# Patient Record
Sex: FEMALE | Race: WHITE | Employment: UNEMPLOYED | URBAN - METROPOLITAN AREA
[De-identification: names, ages, dates, MRNs, and addresses within clinical notes are randomized per-mention and may not be internally consistent; named-entity substitution may affect disease eponyms.]

---

## 2021-05-20 ENCOUNTER — HOSPITAL ENCOUNTER (OUTPATIENT)
Age: 66
Setting detail: SPECIMEN
Discharge: HOME | End: 2021-05-20
Payer: MEDICARE

## 2021-05-25 LAB — CYTOLOGY REPORT: NORMAL

## 2021-07-03 ENCOUNTER — HOSPITAL ENCOUNTER (EMERGENCY)
Facility: CLINIC | Age: 66
Discharge: HOME | End: 2021-07-03
Attending: EMERGENCY MEDICINE
Payer: MEDICARE

## 2021-07-03 ENCOUNTER — APPOINTMENT (OUTPATIENT)
Dept: GENERAL RADIOLOGY | Facility: CLINIC | Age: 66
End: 2021-07-03
Payer: MEDICARE

## 2021-07-03 VITALS
RESPIRATION RATE: 18 BRPM | DIASTOLIC BLOOD PRESSURE: 88 MMHG | BODY MASS INDEX: 33.34 KG/M2 | TEMPERATURE: 98.2 F | OXYGEN SATURATION: 95 % | WEIGHT: 220 LBS | SYSTOLIC BLOOD PRESSURE: 160 MMHG | HEIGHT: 68 IN | HEART RATE: 73 BPM

## 2021-07-03 DIAGNOSIS — R05.9 COUGH: Primary | ICD-10-CM

## 2021-07-03 PROCEDURE — 6360000002 HC RX W HCPCS: Performed by: EMERGENCY MEDICINE

## 2021-07-03 PROCEDURE — 71045 X-RAY EXAM CHEST 1 VIEW: CPT

## 2021-07-03 PROCEDURE — 99283 EMERGENCY DEPT VISIT LOW MDM: CPT

## 2021-07-03 RX ORDER — METOPROLOL SUCCINATE 25 MG/1
25 TABLET, EXTENDED RELEASE ORAL DAILY
COMMUNITY
End: 2021-11-03 | Stop reason: SDUPTHER

## 2021-07-03 RX ORDER — ACETAMINOPHEN 160 MG
TABLET,DISINTEGRATING ORAL
COMMUNITY

## 2021-07-03 RX ORDER — LEVALBUTEROL 1.25 MG/.5ML
0.63 SOLUTION, CONCENTRATE RESPIRATORY (INHALATION) ONCE
Status: COMPLETED | OUTPATIENT
Start: 2021-07-03 | End: 2021-07-03

## 2021-07-03 RX ORDER — LEVALBUTEROL INHALATION SOLUTION 0.63 MG/3ML
1 SOLUTION RESPIRATORY (INHALATION) EVERY 8 HOURS PRN
Qty: 30 VIAL | Refills: 0 | Status: SHIPPED | OUTPATIENT
Start: 2021-07-03 | End: 2021-11-03

## 2021-07-03 RX ORDER — LEVOTHYROXINE SODIUM 0.15 MG/1
150 TABLET ORAL DAILY
COMMUNITY

## 2021-07-03 RX ORDER — OMEPRAZOLE 20 MG/1
20 CAPSULE, DELAYED RELEASE ORAL DAILY
COMMUNITY

## 2021-07-03 RX ORDER — ROSUVASTATIN CALCIUM 5 MG/1
5 TABLET, COATED ORAL DAILY
COMMUNITY

## 2021-07-03 RX ORDER — PREGABALIN 50 MG/1
50 CAPSULE ORAL NIGHTLY
COMMUNITY

## 2021-07-03 RX ORDER — EZETIMIBE 10 MG/1
10 TABLET ORAL DAILY
COMMUNITY

## 2021-07-03 RX ADMIN — LEVALBUTEROL 0.63 MG: 1.25 SOLUTION, CONCENTRATE RESPIRATORY (INHALATION) at 20:48

## 2021-07-03 RX ADMIN — LEVALBUTEROL 0.63 MG: 1.25 SOLUTION, CONCENTRATE RESPIRATORY (INHALATION) at 20:11

## 2021-07-03 ASSESSMENT — ENCOUNTER SYMPTOMS
SORE THROAT: 0
DIARRHEA: 0
VOMITING: 0
SHORTNESS OF BREATH: 0
COUGH: 1

## 2021-07-03 NOTE — ED PROVIDER NOTES
[facility] ED  [address] [facility]  Phone: [phone]        [address] [facility] ED  EMERGENCY DEPARTMENT ENCOUNTER      Pt Name: Erika Waterman  MRN: [id number]  Bessiegframila 1955  Date of evaluation: 7/3/2021  Provider: Christiano Flores DO    CHIEF COMPLAINT       Chief Complaint   Patient presents with    Cough         HISTORY OF PRESENT ILLNESS   (Location/Symptom, Timing/Onset,Context/Setting, Quality, Duration, Modifying Factors, Severity)  Note limiting factors. Erika Waterman is a 63 y.o. female who presents to the emergency department for the evaluation of continued cough. This is been going on for almost a month. She initially saw her primary care physician who gave her a steroid shot and put her on a Z-Cristofer and she states she got a little bit better but then it seemed to come back and be worse. She does have some hoarseness/tightness in her chest.  She does not smoke cigarettes but she states she has a history of asthma. She is not having wheezing. Her cough is mostly dry but it intermittently productive of white sputum. No vomiting, no diarrhea, no fever. No other acute complaints. Nursing Notes were reviewed. REVIEW OF SYSTEMS    (2-9systems for level 4, 10 or more for level 5)     Review of Systems   Constitutional: Negative for fever. HENT: Negative for sore throat. Respiratory: Positive for cough. Negative for shortness of breath. Cardiovascular: Negative for chest pain. Gastrointestinal: Negative for diarrhea and vomiting. Genitourinary: Negative for dysuria. Skin: Negative for rash. Neurological: Negative for weakness. All other systems reviewed and are negative. Except asnoted above the remainder of the review of systems was reviewed and negative. PAST MEDICAL HISTORY   History reviewed. No pertinent past medical history. SURGICAL HISTORY     History reviewed. No pertinent surgical history.       CURRENT MEDICATIONS Previous Medications    CHOLECALCIFEROL (VITAMIN D3) 50 MCG (2000 UT) CAPS    Take by mouth    COENZYME Q10 (Q-10 CO-ENZYME PO)    Take by mouth    DRONEDARONE HCL (MULTAQ) 400 MG TABS    Take 400 mg by mouth 2 times daily (with meals)    EZETIMIBE (ZETIA) 10 MG TABLET    Take 10 mg by mouth daily    LEVALBUTEROL HCL (XOPENEX IN)    Inhale into the lungs    LEVOTHYROXINE (SYNTHROID) 150 MCG TABLET    Take 150 mcg by mouth Daily    METFORMIN (GLUCOPHAGE) 500 MG TABLET    Take 500 mg by mouth 2 times daily (with meals)    METOPROLOL SUCCINATE (TOPROL XL) 25 MG EXTENDED RELEASE TABLET    Take 25 mg by mouth daily    MOMETASONE FUROATE (ASMANEX HFA IN)    Inhale into the lungs    OMEPRAZOLE (PRILOSEC) 20 MG DELAYED RELEASE CAPSULE    Take 20 mg by mouth daily    PRASTERONE 6.5 MG INST    Place vaginally    PREGABALIN (LYRICA) 50 MG CAPSULE    Take 50 mg by mouth nightly. RIVAROXABAN (XARELTO) 20 MG TABS TABLET    Take 20 mg by mouth    ROSUVASTATIN (CRESTOR) 5 MG TABLET    Take 5 mg by mouth daily       ALLERGIES     Albuterol, Cardizem [diltiazem], Flu virus vaccine, and Lisinopril    FAMILY HISTORY     History reviewed. No pertinent family history.        SOCIAL HISTORY       Social History     Socioeconomic History    Marital status: None     Spouse name: None    Number of children: None    Years of education: None    Highest education level: None   Occupational History    None   Tobacco Use    Smoking status: Never Smoker    Smokeless tobacco: Never Used   Substance and Sexual Activity    Alcohol use: None    Drug use: None    Sexual activity: None   Other Topics Concern    None   Social History Narrative    None     Social Determinants of Health     Financial Resource Strain:     Difficulty of Paying Living Expenses:    Food Insecurity:     Worried About Running Out of Food in the Last Year:     Ran Out of Food in the Last Year:    Transportation Needs:     Lack of Transportation (Medical): and dry. Capillary Refill: Capillary refill takes less than 2 seconds. Findings: No rash. Neurological:      General: No focal deficit present. Mental Status: She is alert. Mental status is at baseline. Motor: No weakness. Comments: Speaking normally. No facial asymmetry. Moving all 4 extremities. Normal gait. Psychiatric:         Mood and Affect: Mood normal.         EMERGENCY DEPARTMENT COURSE and DIFFERENTIAL DIAGNOSIS/MDM:   Vitals:    Vitals:    07/03/21 1842   BP: (!) 160/88   Pulse: 73   Resp: 18   Temp: 98.2 °F (36.8 °C)   TempSrc: Oral   SpO2: 95%   Weight: 99.8 kg (220 lb)   Height: 5' 8\" (1.727 m)       Patient presents to the emergency department with a complaint described above. Vital signs show hypertension, otherwise unremarkable. Physical exam reveals no obvious abnormalities, no adventitious lung sounds. At this time I am going to obtain chest x-ray and I will then reevaluate      DIAGNOSTIC RESULTS     LABS:  Labs Reviewed - No data to display    All other labs were within normal range or not returned as of this dictation. RADIOLOGY:  XR CHEST PORTABLE    (Results Pending)                PROCEDURES:  Unless otherwise noted below, none     Procedures    FINAL IMPRESSION    No diagnosis found. DISPOSITION/PLAN   DISPOSITION        PATIENT REFERRED TO:  No follow-up provider specified.     DISCHARGE MEDICATIONS:  New Prescriptions    No medications on file          (Please note that portions of this note were completed with a voice recognition program.  Efforts were made to edit the dictations but occasionally words are mis-transcribed.)    Kary Humphrey DO (electronically signed)  Board Certified Emergency Physician         Kary Humphrey DO  07/05/21 6840

## 2021-07-04 NOTE — ED PROVIDER NOTES
[facility] ED  [address] [facility]  Phone: [phone]        ADDENDUM:      Care of this patient was assumed from [place] the patient was seen for Cough  . The patient's initial evaluation and plan have been discussed with the prior provider who initially evaluated the patient. Nursing Notes, Past Medical Hx, Past Surgical Hx, Allergies, were all reviewed. PAST MEDICAL HISTORY    has no past medical history on file. SURGICAL HISTORY      has no past surgical history on file. CURRENT MEDICATIONS       Previous Medications    CHOLECALCIFEROL (VITAMIN D3) 50 MCG (2000 UT) CAPS    Take by mouth    COENZYME Q10 (Q-10 CO-ENZYME PO)    Take by mouth    DRONEDARONE HCL (MULTAQ) 400 MG TABS    Take 400 mg by mouth 2 times daily (with meals)    EZETIMIBE (ZETIA) 10 MG TABLET    Take 10 mg by mouth daily    LEVALBUTEROL HCL (XOPENEX IN)    Inhale into the lungs    LEVOTHYROXINE (SYNTHROID) 150 MCG TABLET    Take 150 mcg by mouth Daily    METFORMIN (GLUCOPHAGE) 500 MG TABLET    Take 500 mg by mouth 2 times daily (with meals)    METOPROLOL SUCCINATE (TOPROL XL) 25 MG EXTENDED RELEASE TABLET    Take 25 mg by mouth daily    MOMETASONE FUROATE (ASMANEX HFA IN)    Inhale into the lungs    OMEPRAZOLE (PRILOSEC) 20 MG DELAYED RELEASE CAPSULE    Take 20 mg by mouth daily    PRASTERONE 6.5 MG INST    Place vaginally    PREGABALIN (LYRICA) 50 MG CAPSULE    Take 50 mg by mouth nightly. RIVAROXABAN (XARELTO) 20 MG TABS TABLET    Take 20 mg by mouth    ROSUVASTATIN (CRESTOR) 5 MG TABLET    Take 5 mg by mouth daily       ALLERGIES     is allergic to albuterol, cardizem [diltiazem], flu virus vaccine, and lisinopril. Diagnostic Results     EKG: All EKG's are interpreted by the Emergency Department Physician who either signs or Co-signs this chart in the [address] a cardiologist.        LABS:   No results found for this visit on 07/03/21.     RADIOLOGY:  XR CHEST PORTABLE   Final Result   No acute LEVALBUTEROL (XOPENEX) 0.63 MG/3ML NEBULIZATION    Take 3 mLs by nebulization every 8 hours as needed for Wheezing       (Please note that portions of this note were completed with a voicerecognition program.  Efforts were made to edit the dictations but occasionally words are mis-transcribed.)    Celestine Blackwood MD,, MD, F.A.C.E.P.   Attending Emergency Medicine Physician                    Celestine Blackwood MD  07/03/21 0103

## 2021-11-03 ENCOUNTER — HOSPITAL ENCOUNTER (OUTPATIENT)
Dept: CARDIAC CATH/INVASIVE PROCEDURES | Age: 66
Discharge: HOME | End: 2021-11-03
Payer: MEDICARE

## 2021-11-03 VITALS
TEMPERATURE: 98.3 F | OXYGEN SATURATION: 95 % | DIASTOLIC BLOOD PRESSURE: 65 MMHG | HEIGHT: 68 IN | WEIGHT: 226 LBS | RESPIRATION RATE: 18 BRPM | HEART RATE: 47 BPM | SYSTOLIC BLOOD PRESSURE: 119 MMHG | BODY MASS INDEX: 34.25 KG/M2

## 2021-11-03 LAB
EKG ATRIAL RATE: 241 BPM
EKG P AXIS: 107 DEGREES
EKG Q-T INTERVAL: 438 MS
EKG QRS DURATION: 106 MS
EKG QTC CALCULATION (BAZETT): 455 MS
EKG R AXIS: -6 DEGREES
EKG T AXIS: -37 DEGREES
EKG VENTRICULAR RATE: 65 BPM
GFR NON-AFRICAN AMERICAN: >60 ML/MIN
GFR SERPL CREATININE-BSD FRML MDRD: >60 ML/MIN
GFR SERPL CREATININE-BSD FRML MDRD: NORMAL ML/MIN/{1.73_M2}
GLUCOSE BLD-MCNC: 128 MG/DL (ref 74–100)
PLATELET # BLD: 165 K/UL (ref 138–453)
POC BUN: 15 MG/DL (ref 8–26)
POC CHLORIDE: 108 MMOL/L (ref 98–107)
POC CREATININE: 0.71 MG/DL (ref 0.51–1.19)
POC HEMATOCRIT: 38 % (ref 36–46)
POC HEMOGLOBIN: 12.9 G/DL (ref 12–16)
POC POTASSIUM: 4.2 MMOL/L (ref 3.5–4.5)
POC SODIUM: 143 MMOL/L (ref 138–146)

## 2021-11-03 PROCEDURE — 99152 MOD SED SAME PHYS/QHP 5/>YRS: CPT

## 2021-11-03 PROCEDURE — 85049 AUTOMATED PLATELET COUNT: CPT

## 2021-11-03 PROCEDURE — 6360000002 HC RX W HCPCS

## 2021-11-03 PROCEDURE — 7100000000 HC PACU RECOVERY - FIRST 15 MIN

## 2021-11-03 PROCEDURE — 85014 HEMATOCRIT: CPT

## 2021-11-03 PROCEDURE — 84520 ASSAY OF UREA NITROGEN: CPT

## 2021-11-03 PROCEDURE — 93005 ELECTROCARDIOGRAM TRACING: CPT | Performed by: INTERNAL MEDICINE

## 2021-11-03 PROCEDURE — 7100000001 HC PACU RECOVERY - ADDTL 15 MIN

## 2021-11-03 PROCEDURE — 84295 ASSAY OF SERUM SODIUM: CPT

## 2021-11-03 PROCEDURE — 82435 ASSAY OF BLOOD CHLORIDE: CPT

## 2021-11-03 PROCEDURE — 82565 ASSAY OF CREATININE: CPT

## 2021-11-03 PROCEDURE — 93010 ELECTROCARDIOGRAM REPORT: CPT | Performed by: INTERNAL MEDICINE

## 2021-11-03 PROCEDURE — 84132 ASSAY OF SERUM POTASSIUM: CPT

## 2021-11-03 PROCEDURE — 82947 ASSAY GLUCOSE BLOOD QUANT: CPT

## 2021-11-03 PROCEDURE — 92960 CARDIOVERSION ELECTRIC EXT: CPT | Performed by: INTERNAL MEDICINE

## 2021-11-03 PROCEDURE — 99153 MOD SED SAME PHYS/QHP EA: CPT

## 2021-11-03 RX ORDER — METOPROLOL SUCCINATE 25 MG/1
12.5 TABLET, EXTENDED RELEASE ORAL DAILY
Qty: 90 TABLET | Refills: 1 | Status: SHIPPED | OUTPATIENT
Start: 2021-11-03 | End: 2021-11-04 | Stop reason: SDUPTHER

## 2021-11-03 RX ORDER — METOPROLOL SUCCINATE 25 MG/1
12.5 TABLET, EXTENDED RELEASE ORAL DAILY
Qty: 30 TABLET | Refills: 1 | Status: SHIPPED | OUTPATIENT
Start: 2021-11-03 | End: 2021-11-03 | Stop reason: SDUPTHER

## 2021-11-03 RX ORDER — SODIUM CHLORIDE 9 MG/ML
INJECTION, SOLUTION INTRAVENOUS CONTINUOUS
Status: DISCONTINUED | OUTPATIENT
Start: 2021-11-03 | End: 2021-11-04 | Stop reason: HOSPADM

## 2021-11-03 RX ORDER — SODIUM CHLORIDE 0.9 % (FLUSH) 0.9 %
5-40 SYRINGE (ML) INJECTION PRN
Status: DISCONTINUED | OUTPATIENT
Start: 2021-11-03 | End: 2021-11-04 | Stop reason: HOSPADM

## 2021-11-03 RX ORDER — SODIUM CHLORIDE 9 MG/ML
25 INJECTION, SOLUTION INTRAVENOUS PRN
Status: DISCONTINUED | OUTPATIENT
Start: 2021-11-03 | End: 2021-11-04 | Stop reason: HOSPADM

## 2021-11-03 RX ORDER — SODIUM CHLORIDE 0.9 % (FLUSH) 0.9 %
5-40 SYRINGE (ML) INJECTION EVERY 12 HOURS SCHEDULED
Status: DISCONTINUED | OUTPATIENT
Start: 2021-11-03 | End: 2021-11-04 | Stop reason: HOSPADM

## 2021-11-03 RX ORDER — PROPAFENONE HYDROCHLORIDE 150 MG/1
150 TABLET, FILM COATED ORAL EVERY 8 HOURS
COMMUNITY

## 2021-11-03 RX ADMIN — SODIUM CHLORIDE: 9 INJECTION, SOLUTION INTRAVENOUS at 08:17

## 2021-11-03 NOTE — PROCEDURES
[facility]  Cardioversion Procedure Note         Today's Date: 11/3/2021  Primary/Ordering Cardiologist: Dr. Piero Malin  Indication: Recurrent atrial fibrillation/flutter    Patient seen and examined. History and Physical reviewed. Labs reviewed. After informed consent was obtained with explanation of the risks and benefits, the patient was prepared using standard tecqniques. All Conscious Sedation was administered via the Cardiologist.     CARDIOVERSION:    After an adequate level of sedation was achieved  200J in biphasic synchronized delivery was administered. conversion to normal sinus rhythm. The patient awoke without complications. A post procedure 12 L ECG was ordered and reviewed. Impression:  Successful Consious Sedation - safely  Successful Cardioversion      Complications: There were no complications encountered. The patient will continue with the discharge meds and has been instructed to follow-up with Dr. Piero Malin for continued long term care and cardiovascular management. There were no complications encountered. Electronically signed on 11/03/21 at 10:42 AM by:    Marzena Osborne MD, MD   Fellow, [address] [place] [address]      Attending Physician Statement  I have discussed the case of Alver Bussing including pertinent history and exam findings with the resident. I have seen and examined the patient and the key elements of the encounter have been performed by me. I agree with the assessment, plan and orders as documented by the resident With changes made to the note.   I was present during entire procedure and performed all critical elements of the procedure    Electronically signed by Miya Wong MD on 11/3/2021 at 2:54 PM.    [facility]      [phone]

## 2021-11-03 NOTE — H&P
[facility]  Pre- Procedure History and Physical/Update          Patient Name:  Adama Reis  MRN:    [id number]  YOB: 1955  Date of evaluation:  11/3/2021       Please refer to the consult note / H&P completed by Dr. Dylan Melgar on 10/25/2021 in the medical record and note that:       [x] I have examined the patient and reviewed the H&P/Consult and there are no changes to be made to the assessment or plan. [] I have examined the patient and reviewed the H&P/Consult and have noted the following changes:        Past Medical History:   Diagnosis Date    Arthritis     Asthma     CAD (coronary artery disease)     afib    Cancer ([facility] [address]     skin cancer on wrist    Diabetes mellitus ([facility] [address]     History of blood transfusion     Hyperlipidemia     Hypertension     Thyroid disease        Past Surgical History:   Procedure Laterality Date    ABLATION OF DYSRHYTHMIC FOCUS      APPENDECTOMY      CHOLECYSTECTOMY      COLONOSCOPY      EAR SURGERY Right 1985    ENDOSCOPY, COLON, DIAGNOSTIC      JOINT REPLACEMENT Left 2011    knee    OTHER SURGICAL HISTORY      difficult intibation needs pediatric tube    OTHER SURGICAL HISTORY      difficult intubation needs pediatric tube    SKIN BIOPSY      TONSILLECTOMY          reports that she has never smoked. She has never used smokeless tobacco. She reports that she does not drink alcohol and does not use drugs. Prior to Admission medications    Medication Sig Start Date End Date Taking?  Authorizing Provider   propafenone (RYTHMOL) 150 MG tablet Take 150 mg by mouth every 8 hours   Yes Historical Provider, MD   rivaroxaban (XARELTO) 20 MG TABS tablet Take 20 mg by mouth   Yes Historical Provider, MD   metFORMIN (GLUCOPHAGE) 500 MG tablet Take 500 mg by mouth 2 times daily (with meals)   Yes Historical Provider, MD   omeprazole (PRILOSEC) 20 MG delayed release capsule Take 20 mg by mouth daily   Yes Historical Provider, MD levothyroxine (SYNTHROID) 150 MCG tablet Take 150 mcg by mouth Daily   Yes Historical Provider, MD   ezetimibe (ZETIA) 10 MG tablet Take 10 mg by mouth daily   Yes Historical Provider, MD   Cholecalciferol (VITAMIN D3) 50 MCG (2000 UT) CAPS Take by mouth   Yes Historical Provider, MD   Mometasone Furoate (ASMANEX HFA IN) Inhale into the lungs   Yes Historical Provider, MD   Prasterone 6.5 MG INST Place vaginally   Yes Historical Provider, MD   metoprolol succinate (TOPROL XL) 25 MG extended release tablet Take 25 mg by mouth daily   Yes Historical Provider, MD   rosuvastatin (CRESTOR) 5 MG tablet Take 5 mg by mouth daily   Yes Historical Provider, MD   Coenzyme Q10 (Q-10 CO-ENZYME PO) Take by mouth   Yes Historical Provider, MD   pregabalin (LYRICA) 50 MG capsule Take 50 mg by mouth nightly. Historical Provider, MD   Levalbuterol HCl (Elnor Gutiérrez) Inhale into the lungs    Historical Provider, MD       Allergies   Allergen Reactions    Albuterol     Cardizem [Diltiazem]     Flu Virus Vaccine     Lisinopril          REVIEW OF SYSTEMS:     A detailed review of system was performed as already noted and is otherwise as above. PHYSICAL EXAM:     Vitals:    11/03/21 0754   BP: 132/70   Pulse: 58   Resp: 16   Temp: 98.3 °F (36.8 °C)   SpO2: 97%        Constitutional and General Appearance: Alert. Not in acute stress. Respiratory:  · Clear to auscultation b/l. No wheeze or crackles. Cardiovascular:  · Irregular rhythm. No murmurs. · Jugular venous pulsation is normal  Abdomen:  · No masses or tenderness  Extremities:  · Lower extremity edema - No  · Skin: Warm and dry  Neurological:  · Alert and oriented. · Moves all extremities well      Active Problems:    * No active hospital problems. *  Resolved Problems:    * No resolved hospital problems. *      Assessment:  1. Recurrent atrial fibrillation/flutter despite previous two ablations and cardioversion. Recently switched to rhythmol.  Has been on Xarelto. 2. HTN  3. HL       Plan:  1. Proceed with planned cardioversion. Pre Procedure Conscious Sedation Data:  ASA Class:                  [] I [] II [] III [] IV        The risks, benefits, and alternatives of the prcoedure were discussed in detail with the patient. The patient agrees to proceed with the procedure, verbalizes understanding and signed consent.        Fernanda Barrera MD, MD  Fellow, [address] [facility]

## 2021-11-03 NOTE — PROGRESS NOTES
Patient admitted, consent signed and questions answered. Patient ready for procedure. Call light to reach with side rails up 2 of 2. family at bedside with patient. Ynes Gary

## 2021-11-03 NOTE — PROGRESS NOTES
Returns post successful cardioversion alert and awake. Faint redness to chest noted. Family at bedside. Fluids offered. Vitals to monitor. Side rails up 2 of 2 with call light to reach.

## 2021-11-04 RX ORDER — METOPROLOL SUCCINATE 25 MG/1
12.5 TABLET, EXTENDED RELEASE ORAL DAILY
Qty: 45 TABLET | Refills: 0 | Status: SHIPPED | OUTPATIENT
Start: 2021-11-04 | End: 2022-02-02

## 2022-01-31 ENCOUNTER — HOSPITAL ENCOUNTER (OUTPATIENT)
Age: 67
Setting detail: SPECIMEN
Discharge: HOME | End: 2022-01-31

## 2022-01-31 LAB
ABSOLUTE EOS #: 0.21 K/UL (ref 0–0.44)
ABSOLUTE IMMATURE GRANULOCYTE: 0.04 K/UL (ref 0–0.3)
ABSOLUTE LYMPH #: 2 K/UL (ref 1.1–3.7)
ABSOLUTE MONO #: 0.66 K/UL (ref 0.1–1.2)
ANION GAP SERPL CALCULATED.3IONS-SCNC: 14 MMOL/L (ref 9–17)
BASOPHILS # BLD: 1 % (ref 0–2)
BASOPHILS ABSOLUTE: 0.06 K/UL (ref 0–0.2)
BUN BLDV-MCNC: 14 MG/DL (ref 8–23)
BUN/CREAT BLD: NORMAL (ref 9–20)
CALCIUM SERPL-MCNC: 9.7 MG/DL (ref 8.6–10.4)
CHLORIDE BLD-SCNC: 102 MMOL/L (ref 98–107)
CO2: 22 MMOL/L (ref 20–31)
CREAT SERPL-MCNC: 0.74 MG/DL (ref 0.5–0.9)
DIFFERENTIAL TYPE: ABNORMAL
EOSINOPHILS RELATIVE PERCENT: 3 % (ref 1–4)
GFR AFRICAN AMERICAN: >60 ML/MIN
GFR NON-AFRICAN AMERICAN: >60 ML/MIN
GFR SERPL CREATININE-BSD FRML MDRD: NORMAL ML/MIN/{1.73_M2}
GFR SERPL CREATININE-BSD FRML MDRD: NORMAL ML/MIN/{1.73_M2}
GLUCOSE BLD-MCNC: 78 MG/DL (ref 70–99)
HCT VFR BLD CALC: 42.3 % (ref 36.3–47.1)
HEMOGLOBIN: 13.3 G/DL (ref 11.9–15.1)
IMMATURE GRANULOCYTES: 1 %
LYMPHOCYTES # BLD: 28 % (ref 24–43)
MAGNESIUM: 1.9 MG/DL (ref 1.6–2.6)
MCH RBC QN AUTO: 29.2 PG (ref 25.2–33.5)
MCHC RBC AUTO-ENTMCNC: 31.4 G/DL (ref 28.4–34.8)
MCV RBC AUTO: 92.8 FL (ref 82.6–102.9)
MONOCYTES # BLD: 9 % (ref 3–12)
NRBC AUTOMATED: 0 PER 100 WBC
PDW BLD-RTO: 15.2 % (ref 11.8–14.4)
PLATELET # BLD: 170 K/UL (ref 138–453)
PLATELET ESTIMATE: ABNORMAL
PMV BLD AUTO: 11.4 FL (ref 8.1–13.5)
POTASSIUM SERPL-SCNC: 4.6 MMOL/L (ref 3.7–5.3)
RBC # BLD: 4.56 M/UL (ref 3.95–5.11)
RBC # BLD: ABNORMAL 10*6/UL
SEG NEUTROPHILS: 58 % (ref 36–65)
SEGMENTED NEUTROPHILS ABSOLUTE COUNT: 4.18 K/UL (ref 1.5–8.1)
SODIUM BLD-SCNC: 138 MMOL/L (ref 135–144)
WBC # BLD: 7.2 K/UL (ref 3.5–11.3)
WBC # BLD: ABNORMAL 10*3/UL

## 2022-02-01 ENCOUNTER — HOSPITAL ENCOUNTER (OUTPATIENT)
Dept: CARDIAC CATH/INVASIVE PROCEDURES | Age: 67
Discharge: HOME | End: 2022-02-01
Payer: MEDICARE

## 2022-02-01 VITALS
HEART RATE: 58 BPM | SYSTOLIC BLOOD PRESSURE: 114 MMHG | HEIGHT: 68 IN | RESPIRATION RATE: 16 BRPM | TEMPERATURE: 98 F | WEIGHT: 225 LBS | DIASTOLIC BLOOD PRESSURE: 48 MMHG | BODY MASS INDEX: 34.1 KG/M2 | OXYGEN SATURATION: 94 %

## 2022-02-01 LAB — GLUCOSE BLD-MCNC: 101 MG/DL (ref 65–105)

## 2022-02-01 PROCEDURE — 92960 CARDIOVERSION ELECTRIC EXT: CPT

## 2022-02-01 PROCEDURE — 82947 ASSAY GLUCOSE BLOOD QUANT: CPT

## 2022-02-01 PROCEDURE — 7100000001 HC PACU RECOVERY - ADDTL 15 MIN

## 2022-02-01 PROCEDURE — 93005 ELECTROCARDIOGRAM TRACING: CPT | Performed by: INTERNAL MEDICINE

## 2022-02-01 PROCEDURE — 7100000000 HC PACU RECOVERY - FIRST 15 MIN

## 2022-02-01 PROCEDURE — 6360000002 HC RX W HCPCS

## 2022-02-01 RX ORDER — SODIUM CHLORIDE 0.9 % (FLUSH) 0.9 %
5-40 SYRINGE (ML) INJECTION EVERY 12 HOURS SCHEDULED
Status: DISCONTINUED | OUTPATIENT
Start: 2022-02-01 | End: 2022-02-02 | Stop reason: HOSPADM

## 2022-02-01 RX ORDER — SODIUM CHLORIDE 9 MG/ML
25 INJECTION, SOLUTION INTRAVENOUS PRN
Status: DISCONTINUED | OUTPATIENT
Start: 2022-02-01 | End: 2022-02-02 | Stop reason: HOSPADM

## 2022-02-01 RX ORDER — SODIUM CHLORIDE 9 MG/ML
INJECTION, SOLUTION INTRAVENOUS CONTINUOUS
Status: DISCONTINUED | OUTPATIENT
Start: 2022-02-01 | End: 2022-02-02 | Stop reason: HOSPADM

## 2022-02-01 RX ORDER — SODIUM CHLORIDE 0.9 % (FLUSH) 0.9 %
5-40 SYRINGE (ML) INJECTION PRN
Status: DISCONTINUED | OUTPATIENT
Start: 2022-02-01 | End: 2022-02-02 | Stop reason: HOSPADM

## 2022-02-01 RX ADMIN — SODIUM CHLORIDE: 9 INJECTION, SOLUTION INTRAVENOUS at 11:11

## 2022-02-01 NOTE — PROGRESS NOTES
All discharge instructions reviewed, questions answered, paper signed and given copy. Patient discharged per w/c with  belongings. slight redness to front and back chest from cardioversion

## 2022-02-01 NOTE — H&P
[facility]  Pre- Procedure History and Physical/Update          Patient Name:  Gladys Rivera  MRN:    [id number]  YOB: 1955  Date of evaluation:  2/1/2022       Please refer to the consult note / H&P completed by on 1/31/2021 in the medical record and note that:       [x] I have examined the patient and reviewed the H&P/Consult and there are no changes to be made to the assessment or plan. [] I have examined the patient and reviewed the H&P/Consult and have noted the following changes:        Past Medical History:   Diagnosis Date    Anticoagulated     xarelto    Arthritis     Asthma     Atrial fibrillation ([address]     Cancer ([facility] [address]     skin cancer on wrist    COVID     Diabetes mellitus ([facility] [address]     Difficult intubation     needs pediatric tube    History of blood transfusion     Hyperlipidemia     Hypertension     Thyroid disease        Past Surgical History:   Procedure Laterality Date    ABLATION OF DYSRHYTHMIC FOCUS      x2    APPENDECTOMY      CARDIOVERSION  11/03/2021    CARDIOVERSION  02/01/2022    CHOLECYSTECTOMY      COLONOSCOPY      EAR SURGERY Right 1985    ENDOSCOPY, COLON, DIAGNOSTIC      JOINT REPLACEMENT Left 2011    knee    SKIN BIOPSY      TONSILLECTOMY          reports that she has never smoked. She has never used smokeless tobacco. She reports that she does not drink alcohol and does not use drugs. Prior to Admission medications    Medication Sig Start Date End Date Taking? Authorizing Provider   metoprolol succinate (TOPROL XL) 25 MG extended release tablet Take 0.5 tablets by mouth daily 11/4/21 2/2/22  Dale Marin MD   propafenone (RYTHMOL) 150 MG tablet Take 150 mg by mouth every 8 hours    Historical Provider, MD   pregabalin (LYRICA) 50 MG capsule Take 50 mg by mouth nightly.     Historical Provider, MD   rivaroxaban (XARELTO) 20 MG TABS tablet Take 20 mg by mouth    Historical Provider, MD   metFORMIN (GLUCOPHAGE) 500 MG tablet Take 500 mg by mouth 2 times daily (with meals)    Historical Provider, MD   omeprazole (PRILOSEC) 20 MG delayed release capsule Take 20 mg by mouth daily    Historical Provider, MD   levothyroxine (SYNTHROID) 150 MCG tablet Take 150 mcg by mouth Daily    Historical Provider, MD   ezetimibe (ZETIA) 10 MG tablet Take 10 mg by mouth daily    Historical Provider, MD   Cholecalciferol (VITAMIN D3) 50 MCG (2000 UT) CAPS Take by mouth    Historical Provider, MD   Mometasone Furoate [facility] HFA IN) Inhale into the lungs    Historical Provider, MD   Levalbuterol HCl (Myrtis Case) Inhale into the lungs    Historical Provider, MD   Prasterone 6.5 MG INST Place vaginally    Historical Provider, MD   rosuvastatin (CRESTOR) 5 MG tablet Take 5 mg by mouth daily    Historical Provider, MD   Coenzyme Q10 (Q-10 CO-ENZYME PO) Take by mouth    Historical Provider, MD       Allergies   Allergen Reactions    Albuterol     Cardizem [Diltiazem]     Influenza Virus Vaccine     Lisinopril          REVIEW OF SYSTEMS:     A detailed review of system was performed as already noted and is otherwise as above. PHYSICAL EXAM:     There were no vitals filed for this visit. Constitutional and General Appearance: Alert. Not in acute stress. Respiratory:  · Clear to auscultation b/l. No wheeze or crackles. Cardiovascular:  · Regular rate and rhythm. No murmurs. · Jugular venous pulsation is normal  Abdomen:  · No masses or tenderness  Extremities:  · Lower extremity edema - No  · Skin: Warm and dry  Neurological:  · Alert and oriented. · Moves all extremities well      Active Problems:    * No active hospital problems. *  Resolved Problems:    * No resolved hospital problems.  *      Assessment:  -Paroxysmal atrial fibrillation/flutter underwent ablation twice by Dr. Megan Antonio and Dr. Brennan Sharp and previous cardioversions the details are not available at this time  -CV to NSR 11/3/21  -Converted back to Atrial flutter    Plan:  1. Proceed with planned cardioversion  2. Further orders to follow. Pre Procedure Conscious Sedation Data:  ASA Class:                  [] I [x] II [] III [] IV     Mallampati Class:       [] I [x] II [] III [] IV       The risks, benefits, and alternatives of the prcoedure were discussed in detail with the patient. The patient agrees to proceed with the procedure, verbalizes understanding and signed consent.        Trini Lorenzo MD, MD  Fellow, [address] [facility]

## 2022-02-01 NOTE — PROGRESS NOTES
Received post procedure to [facility] to room 5. Assessment obtained. Restrictions reviewed with patient. Post procedure pathway initiated. Family at side. Patient without complaints.

## 2022-02-01 NOTE — PROCEDURES
[facility]  Cardioversion Procedure Note         Today's Date: 2/1/2022    Indication: atrial flutter    Patient seen and examined. History and Physical reviewed. Labs reviewed. After informed consent was obtained with explanation of the risks and benefits, the patient was prepared using standard tecqniques. All Conscious Sedation was administered via the Cardiologist.     CARDIOVERSION:    After an adequate level of sedation was achieved  300J in biphasic synchronized delivery was administered. conversion to normal sinus rhythm. The patient awoke without complications. Impression:  Successful Consious Sedation - safely  Successful Cardioversion      Complications: There were no complications encountered. The patient will continue with the discharge meds and has been instructed to follow-up in clinic for continued long term care and cardiovascular management. There were no complications encountered. Electronically signed on 02/01/22 at 12:35 PM by:    Gume Rush MD, MD   Fellow, [address]    I have reviewed the case / procedure with resident / fellow  I have examined the patient personally  Patient agree with treatment plan as discussed before, final arrangement based on my evaluation and exam.    Risk and benefit of procedure planned were explained in details. Procedure was performed by me personally, with all aspect of the procedure being done using standard protocol. Note was modified based on my own assessment and treatment.     Irais Gonzales MD  [facility]

## 2022-02-03 LAB
EKG ATRIAL RATE: 271 BPM
EKG P AXIS: 95 DEGREES
EKG Q-T INTERVAL: 428 MS
EKG QRS DURATION: 128 MS
EKG QTC CALCULATION (BAZETT): 500 MS
EKG R AXIS: 3 DEGREES
EKG T AXIS: -18 DEGREES
EKG VENTRICULAR RATE: 82 BPM

## 2022-02-03 PROCEDURE — 93010 ELECTROCARDIOGRAM REPORT: CPT | Performed by: INTERNAL MEDICINE

## 2023-05-12 PROBLEM — I10 PRIMARY HYPERTENSION: Status: ACTIVE | Noted: 2023-05-12

## 2023-05-12 PROBLEM — I48.0 AF (PAROXYSMAL ATRIAL FIBRILLATION) (HCC): Status: ACTIVE | Noted: 2023-05-12

## 2023-05-12 PROBLEM — E78.2 MIXED HYPERLIPIDEMIA: Status: ACTIVE | Noted: 2023-05-12

## 2024-01-02 ENCOUNTER — HOSPITAL ENCOUNTER (OUTPATIENT)
Dept: SURGERY | Age: 69
Discharge: HOME | End: 2024-01-02
Payer: MEDICARE

## 2024-01-02 VITALS
OXYGEN SATURATION: 98 % | HEART RATE: 63 BPM | HEIGHT: 68 IN | WEIGHT: 222 LBS | DIASTOLIC BLOOD PRESSURE: 70 MMHG | BODY MASS INDEX: 33.65 KG/M2 | SYSTOLIC BLOOD PRESSURE: 148 MMHG

## 2024-01-02 DIAGNOSIS — M62.08 DIASTASIS RECTI: ICD-10-CM

## 2024-01-02 DIAGNOSIS — K43.9 VENTRAL HERNIA WITHOUT OBSTRUCTION OR GANGRENE: ICD-10-CM

## 2024-01-02 PROCEDURE — 99204 OFFICE O/P NEW MOD 45 MIN: CPT | Performed by: SURGERY

## 2024-01-02 PROCEDURE — 99202 OFFICE O/P NEW SF 15 MIN: CPT

## 2024-01-03 LAB
BASOPHILS # BLD: 0.05 X10^3UL
EOSINOPHIL # BLD: 0.16 X10^3UL
ERYTHROCYTE [DISTWIDTH] IN BLOOD BY AUTOMATED COUNT: 52.1 FL
FERRITIN: 421 NG/ML
HCT VFR BLD CALC: 39.5 %
HEMOGLOBIN: 12.9 G/DL
IMMATURE GRANULOCYTES: 0.03 X10^3UL
IRON % SATURATION: 34 %
IRON: 104 UG/DL
LYMPHOCYTES # BLD: 2.31 X10^3UL
MCH RBC QN AUTO: 29.1 PG
MCHC RBC AUTO-ENTMCNC: 32.7 G/DL
MCV RBC AUTO: 89 FL
MONOCYTES # BLD: 0.63 X10^3UL
NEUTROPHILS: 4.08 X10^3UL
NUCLEATED RED BLOOD CELLS: 0
PLATELETS: 195 X10^3UL
RBC: 4.44 X10^6UL
TOTAL IRON BINDING CAPACITY: 307 UG/DL
WBC: 7.26 X10^3UL

## 2024-01-04 PROBLEM — K43.9 VENTRAL HERNIA WITHOUT OBSTRUCTION OR GANGRENE: Status: ACTIVE | Noted: 2024-01-04

## 2024-01-04 PROBLEM — M62.08 DIASTASIS RECTI: Status: ACTIVE | Noted: 2024-01-04

## 2024-01-04 NOTE — H&P
[facility]  Clinic Evaluation  Toby Liang DO    Pt Name: Ni Jenkins  MRN: [id number]  YOB: 1955  Date of evaluation: 1/2/2024  Primary Care Physician: Domenic Lovelace MD    Chief Complaint:   Ventral hernia, infraumbilical  Diastases recti      SUBJECTIVE:    History of Present Illness:     This is a 68 y.o.  female who presents for evaluation for the above, patient reports epigastric and mid abdominal abdominal wall discomfort that is frequently associated with ADLs, no limitations to ADLs.  Denies prior attempts at abdominal wall hernia repair, denies history of obstruction. Her  is also present.    Chart review performed to add information to the HPI: Yes    Past Medical History   has a past medical history of Anticoagulated, Arthritis, Asthma, Atrial fibrillation (HCC), Cancer (HCC), COVID, Diabetes mellitus (HCC), Difficult intubation, History of blood transfusion, Hyperlipidemia, Hypertension, and Thyroid disease.    Past Surgical History   has a past surgical history that includes Cholecystectomy; Appendectomy; Colonoscopy; Endoscopy, colon, diagnostic; ablation of dysrhythmic focus; Tonsillectomy; skin biopsy; joint replacement (Left, 2011); Ear surgery (Right, 1985); Cardioversion (11/03/2021); Cardioversion (02/01/2022); and Cardiac surgery (02/01/2022).    Family History  family history includes Heart Disease in her brother, father, mother, and sister.    Social History  Tobacco use:  reports that she has never smoked. She has never used smokeless tobacco.  Alcohol use:  reports no history of alcohol use.  Drug use:  reports no history of drug use.      Medications  Current Medications:   Current Outpatient Medications   Medication Sig Dispense Refill    omeprazole (PRILOSEC) 20 MG delayed release capsule Take 1 capsule by mouth 2 times daily (before meals) 60 capsule 0    rivaroxaban (XARELTO) 20 MG TABS tablet Take 1 tablet by mouth daily 90 tablet 3    losartan